# Patient Record
Sex: MALE | Race: WHITE | Employment: STUDENT | URBAN - METROPOLITAN AREA
[De-identification: names, ages, dates, MRNs, and addresses within clinical notes are randomized per-mention and may not be internally consistent; named-entity substitution may affect disease eponyms.]

---

## 2017-07-22 ENCOUNTER — ANESTHESIA EVENT (OUTPATIENT)
Dept: SURGERY | Facility: HOSPITAL | Age: 22
End: 2017-07-22
Payer: COMMERCIAL

## 2017-07-22 ENCOUNTER — HOSPITAL ENCOUNTER (OUTPATIENT)
Facility: HOSPITAL | Age: 22
Setting detail: HOSPITAL OUTPATIENT SURGERY
Discharge: HOME | End: 2017-07-22
Attending: OTOLARYNGOLOGY | Admitting: OTOLARYNGOLOGY
Payer: COMMERCIAL

## 2017-07-22 ENCOUNTER — APPOINTMENT (OUTPATIENT)
Dept: CV DIAGNOSTICS | Facility: HOSPITAL | Age: 22
End: 2017-07-22
Attending: INTERNAL MEDICINE
Payer: COMMERCIAL

## 2017-07-22 ENCOUNTER — SURGERY (OUTPATIENT)
Age: 22
End: 2017-07-22

## 2017-07-22 ENCOUNTER — ANESTHESIA (OUTPATIENT)
Dept: SURGERY | Facility: HOSPITAL | Age: 22
End: 2017-07-22
Payer: COMMERCIAL

## 2017-07-22 VITALS
HEART RATE: 74 BPM | BODY MASS INDEX: 22.96 KG/M2 | TEMPERATURE: 98 F | HEIGHT: 78 IN | OXYGEN SATURATION: 99 % | DIASTOLIC BLOOD PRESSURE: 82 MMHG | SYSTOLIC BLOOD PRESSURE: 130 MMHG | RESPIRATION RATE: 16 BRPM | WEIGHT: 198.44 LBS

## 2017-07-22 DIAGNOSIS — J34.2 DEVIATED NASAL SEPTUM: ICD-10-CM

## 2017-07-22 DIAGNOSIS — J34.89 NASAL OBSTRUCTION: ICD-10-CM

## 2017-07-22 DIAGNOSIS — J34.3 HYPERTROPHY OF NASAL TURBINATES: ICD-10-CM

## 2017-07-22 DIAGNOSIS — M95.0 ACQUIRED DEFORMITY OF NOSE: ICD-10-CM

## 2017-07-22 LAB
ATRIAL RATE: 83 BPM
BUN BLD-MCNC: 14 MG/DL (ref 8–20)
CALCIUM BLD-MCNC: 9.1 MG/DL (ref 8.3–10.3)
CHLORIDE: 106 MMOL/L (ref 101–111)
CO2: 25 MMOL/L (ref 22–32)
CREAT BLD-MCNC: 0.98 MG/DL (ref 0.7–1.3)
GLUCOSE BLD-MCNC: 118 MG/DL (ref 70–99)
HAV IGM SER QL: 1.7 MG/DL (ref 1.7–3)
P AXIS: 78 DEGREES
P-R INTERVAL: 164 MS
POTASSIUM SERPL-SCNC: 3.6 MMOL/L (ref 3.6–5.1)
Q-T INTERVAL: 386 MS
QRS DURATION: 88 MS
QTC CALCULATION (BEZET): 453 MS
R AXIS: 80 DEGREES
SODIUM SERPL-SCNC: 140 MMOL/L (ref 136–144)
T AXIS: 60 DEGREES
VENTRICULAR RATE: 83 BPM

## 2017-07-22 PROCEDURE — 09BL0ZZ EXCISION OF NASAL TURBINATE, OPEN APPROACH: ICD-10-PCS | Performed by: OTOLARYNGOLOGY

## 2017-07-22 PROCEDURE — 09UK07Z SUPPLEMENT NASAL MUCOSA AND SOFT TISSUE WITH AUTOLOGOUS TISSUE SUBSTITUTE, OPEN APPROACH: ICD-10-PCS | Performed by: OTOLARYNGOLOGY

## 2017-07-22 PROCEDURE — 93306 TTE W/DOPPLER COMPLETE: CPT | Performed by: INTERNAL MEDICINE

## 2017-07-22 PROCEDURE — 0NSB0ZZ REPOSITION NASAL BONE, OPEN APPROACH: ICD-10-PCS | Performed by: OTOLARYNGOLOGY

## 2017-07-22 PROCEDURE — 93005 ELECTROCARDIOGRAM TRACING: CPT

## 2017-07-22 PROCEDURE — 09CM0ZZ EXTIRPATION OF MATTER FROM NASAL SEPTUM, OPEN APPROACH: ICD-10-PCS | Performed by: OTOLARYNGOLOGY

## 2017-07-22 PROCEDURE — 83735 ASSAY OF MAGNESIUM: CPT | Performed by: INTERNAL MEDICINE

## 2017-07-22 PROCEDURE — 80048 BASIC METABOLIC PNL TOTAL CA: CPT | Performed by: INTERNAL MEDICINE

## 2017-07-22 PROCEDURE — 93010 ELECTROCARDIOGRAM REPORT: CPT | Performed by: INTERNAL MEDICINE

## 2017-07-22 RX ORDER — HYDROCODONE BITARTRATE AND ACETAMINOPHEN 5; 325 MG/1; MG/1
TABLET ORAL
Status: COMPLETED
Start: 2017-07-22 | End: 2017-07-22

## 2017-07-22 RX ORDER — SODIUM CHLORIDE, SODIUM LACTATE, POTASSIUM CHLORIDE, CALCIUM CHLORIDE 600; 310; 30; 20 MG/100ML; MG/100ML; MG/100ML; MG/100ML
INJECTION, SOLUTION INTRAVENOUS CONTINUOUS
Status: DISCONTINUED | OUTPATIENT
Start: 2017-07-22 | End: 2017-07-22

## 2017-07-22 RX ORDER — HYDROCODONE BITARTRATE AND ACETAMINOPHEN 5; 325 MG/1; MG/1
1 TABLET ORAL AS NEEDED
Status: COMPLETED | OUTPATIENT
Start: 2017-07-22 | End: 2017-07-22

## 2017-07-22 RX ORDER — HYDROCODONE BITARTRATE AND ACETAMINOPHEN 5; 325 MG/1; MG/1
2 TABLET ORAL AS NEEDED
Status: COMPLETED | OUTPATIENT
Start: 2017-07-22 | End: 2017-07-22

## 2017-07-22 RX ORDER — DEXAMETHASONE SODIUM PHOSPHATE 4 MG/ML
4 VIAL (ML) INJECTION AS NEEDED
Status: ACTIVE | OUTPATIENT
Start: 2017-07-22 | End: 2017-07-22

## 2017-07-22 RX ORDER — OXYCODONE HYDROCHLORIDE AND ACETAMINOPHEN 5; 325 MG/1; MG/1
TABLET ORAL
Qty: 30 TABLET | Refills: 0 | Status: SHIPPED | OUTPATIENT
Start: 2017-07-22 | End: 2018-07-23 | Stop reason: ALTCHOICE

## 2017-07-22 RX ORDER — ONDANSETRON 2 MG/ML
4 INJECTION INTRAMUSCULAR; INTRAVENOUS AS NEEDED
Status: ACTIVE | OUTPATIENT
Start: 2017-07-22 | End: 2017-07-22

## 2017-07-22 RX ORDER — POTASSIUM CHLORIDE 29.8 MG/ML
INJECTION INTRAVENOUS
Status: COMPLETED
Start: 2017-07-22 | End: 2017-07-22

## 2017-07-22 RX ORDER — NALOXONE HYDROCHLORIDE 0.4 MG/ML
80 INJECTION, SOLUTION INTRAMUSCULAR; INTRAVENOUS; SUBCUTANEOUS AS NEEDED
Status: ACTIVE | OUTPATIENT
Start: 2017-07-22 | End: 2017-07-22

## 2017-07-22 RX ORDER — POTASSIUM CHLORIDE 29.8 MG/ML
40 INJECTION INTRAVENOUS ONCE
Status: DISCONTINUED | OUTPATIENT
Start: 2017-07-22 | End: 2017-07-22 | Stop reason: SDUPTHER

## 2017-07-22 RX ORDER — HYDROMORPHONE HYDROCHLORIDE 1 MG/ML
0.4 INJECTION, SOLUTION INTRAMUSCULAR; INTRAVENOUS; SUBCUTANEOUS EVERY 5 MIN PRN
Status: ACTIVE | OUTPATIENT
Start: 2017-07-22 | End: 2017-07-22

## 2017-07-22 RX ORDER — CEPHALEXIN 500 MG/1
500 CAPSULE ORAL 3 TIMES DAILY
Qty: 21 CAPSULE | Refills: 0 | Status: SHIPPED | OUTPATIENT
Start: 2017-07-22 | End: 2017-07-29

## 2017-07-22 RX ORDER — LIDOCAINE HYDROCHLORIDE AND EPINEPHRINE 10; 10 MG/ML; UG/ML
INJECTION, SOLUTION INFILTRATION; PERINEURAL AS NEEDED
Status: DISCONTINUED | OUTPATIENT
Start: 2017-07-22 | End: 2017-07-22 | Stop reason: HOSPADM

## 2017-07-22 RX ORDER — METOCLOPRAMIDE HYDROCHLORIDE 5 MG/ML
10 INJECTION INTRAMUSCULAR; INTRAVENOUS AS NEEDED
Status: ACTIVE | OUTPATIENT
Start: 2017-07-22 | End: 2017-07-22

## 2017-07-22 RX ORDER — CEFAZOLIN SODIUM 1 G/3ML
INJECTION, POWDER, FOR SOLUTION INTRAMUSCULAR; INTRAVENOUS
Status: DISCONTINUED | OUTPATIENT
Start: 2017-07-22 | End: 2017-07-22 | Stop reason: HOSPADM

## 2017-07-22 NOTE — OR NURSING
12 Lead EKG performed. 1152: Dr. Larios looked at 12 Lead EKG. No new orders received. Will monitor patient for a while longer in pacu. 200 Family notified of patient status and plan of care. Verbalized understanding.

## 2017-07-22 NOTE — H&P
HISTORY OF PRESENT ILLNESS: Gm Dumont is here for a followup on his nasal fracture and nasal obstruction. He is status post closed reduction of a nasal fracture which occurred on July 14, 2016. He is still having trouble with nasal obstruction.  This pre Sternocleidomastoid is symmetric. Neck has full range of motion. Ears: Right auricle reveals no lesions, EAC clear and normal, tympanic membrane is normal with normal mobility.  Left auricle reveals no lesions, EAC clear and normal, tympanic membrane is nor       Norberto Alcantar MD

## 2017-07-22 NOTE — OR NURSING
Dr. Kori Bailey here to see patient. Family at bedside. Lab here to draw labs. Awaiting stat bedside Echo.

## 2017-07-22 NOTE — OR NURSING
Dr. Kulwant Michelle called and stated that she spoke with Dr. Abbe Broussard and Dr. Abbe Broussard would like patient to be evaluated by cardiology. [id number] [address] with Dr. Marissa Mccord.   Dr. Marissa Mccord states going into emergency procedure and will not be able to see patien

## 2017-07-22 NOTE — OR NURSING
Patient eating soup and milkshake. Tolerating well. Family at cartside. Patient denies need for pain medication at this time. Nasal drip gauze dressing remains clean dry and intact. Potassium rider infusing.

## 2017-07-22 NOTE — BRIEF OP NOTE
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 2]Hypertrophy of nasal turbinates [J

## 2017-07-22 NOTE — OR NURSING
Patient noted to have short run of V tach. 8 beats, heart rate returned to 70's-80's. Patient states he was trying to swallow and felt heart rate go up, denies chest pain. Patient states he feels \"somewhat anxious about swallowing. \"  Page to Dr. Hiro George

## 2017-07-22 NOTE — OR NURSING
Echo completed. Awaiting Wilman Murillo from pharmacy. Afrin 2 sprays to both nostrils as okay per Dr. Abbe Broussard.

## 2017-07-22 NOTE — ANESTHESIA POSTPROCEDURE EVALUATION
Paul Ashkan Patient Status:  Hospital Outpatient Surgery   Age/Gender 23year old male MRN [id number]   [facility] SURGERY Attending Eda Foster MD   [facility] Day # 0 PCP Neo Smith MD       Anesthesia Post-op Note

## 2017-07-22 NOTE — OR NURSING
[facility] Parents went to get patient lunch. K rider infusing. Drainage from back of throat decreased since Afrin spray to nostrils and sitting more upright in bed.

## 2017-07-22 NOTE — CONSULTS
[facility] [address] [place] [address] Cardiology  Consultation Note      Francois Mena Patient Status:  Hospital Outpatient Surgery    1995 MRN [id number]   Location [facility] Attending Teresita Bhakta MD   Hosp Day # 0 PCP Bebe Rocha Past Surgical History:  WISDOM TEETH: OTHER  No date: OTHER SURGICAL HISTORY      Comment: no psh   No date: OTHER SURGICAL HISTORY      Comment: nasal    Family History  family history is not on file.     Social History   reports that he has never sm normally  Psychiatric: Normal mood and affect; answers questions appropriately  Dermatologic: No rashes; normal skin turgor    Diagnostic testing:    EKG: Normal sinus rhythm    Labs:   No results found for: PT, INR            Thank you for allowing our pr

## 2017-07-22 NOTE — OR NURSING
Patient continues to expectorate bloody drainage from mouth. Approximately 50 cc in suction canister.

## 2017-07-22 NOTE — OR NURSING
Dr. Samuel Carballo here to see patient. Patient denies chest pain or palpitations. States \"I know exactly when my heartrate went up, I was trying to swallow. \"    No further arrhythmias noted. 12 lead EKG ordered by Dr. Samuel Carballo.

## 2017-07-22 NOTE — OR NURSING
1234 Dr. Wm Alston here to see patient. Dr. Rashida Saucedo called and spoke with Dr. Wm Alston and would like stat BMP, Magnesium, and Echo. Dr. Rashida Saucedo will be down to see patient. Family aware.

## 2017-07-22 NOTE — ANESTHESIA PREPROCEDURE EVALUATION
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         Dental             Pulmonary    Pulmonary exam normal.                 Other findings            ASA: 1   Plan: general  NPO status verified and patient meets guide

## 2017-07-24 NOTE — OPERATIVE REPORT
[facility]    PATIENT'S NAME: Eriberto Puri   ATTENDING PHYSICIAN: Peter Collier M.D. OPERATING PHYSICIAN: Peter Collier M.D.    PATIENT ACCOUNT#:   [de-identified]    LOCATION:  PACU 1404 [facility] PACU 2 EDWP 10  MEDICAL RECORD #:   [id number]       DATE OF BIR mucoperichondrium on the right and on the left, using 10 mL on each side. I used 10 mL to inject the columella, the marginal incision area, and the dorsum of the nose, as well the nasofacial sulcus.   The patient was then prepped and draped in a standard s flap without any tears. I also raised an inferior tunnel on the patient's left side. I was able to raise the flap with only a very small tear in the midportion about 3 mm. This was at least long-term back.   I then raised the flap on the opposite side, whi lower lateral cartilages to the septal angle without change in rotation or the tip projection. I then used a 5-0 purple PDS to suture the tip defining points together without changing the tip anatomy.   I closed the columellar incision with a 6-0 blue Prol

## 2018-08-02 PROBLEM — R79.89 ELEVATED SERUM CREATININE: Status: ACTIVE | Noted: 2018-08-02

## (undated) DEVICE — PREP BETADINE SOL 5% EYE

## (undated) DEVICE — CAUTERY NEEDLE 2IN INS E1465

## (undated) DEVICE — GLOVE SURG SENSICARE SZ 8-1/2

## (undated) DEVICE — SUTURE PROLENE 6-0 P-3

## (undated) DEVICE — STANDARD HYPODERMIC NEEDLE,POLYPROPYLENE HUB: Brand: MONOJECT

## (undated) DEVICE — SUTURE PROLENE 4-0 PS-2

## (undated) DEVICE — SPLINT 1524050 5PK PAIR DOYLE II AIRWAY: Brand: DOYLE II ™

## (undated) DEVICE — TOWEL: OR BLU 80/CS: Brand: MEDICAL ACTION INDUSTRIES

## (undated) DEVICE — MARKER SKIN 2 TIP

## (undated) DEVICE — BIPOLAR FORCEPS CORD,BANANA LEADS: Brand: VALLEYLAB

## (undated) DEVICE — SYRINGE 10ML LL CONTRL SYRINGE

## (undated) DEVICE — SPLINT NASAL DENVER PETITE

## (undated) DEVICE — SUTURE PLAIN GUT 4-0 SC-1

## (undated) DEVICE — BLADE 1882040 5PK INFERIOR TURB 2MM

## (undated) DEVICE — HEAD AND NECK CDS-LF: Brand: MEDLINE INDUSTRIES, INC.

## (undated) DEVICE — #11 STERILE BLADE: Brand: POLYMER COATED BLADES

## (undated) DEVICE — SOL H2O 1000ML BTL

## (undated) DEVICE — SOL  .9 1000ML BTL

## (undated) DEVICE — REM POLYHESIVE ADULT PATIENT RETURN ELECTRODE: Brand: VALLEYLAB

## (undated) DEVICE — SUTURE PDS II 5-0 RB-1

## (undated) DEVICE — DECANTER BAG 9": Brand: MEDLINE INDUSTRIES, INC.

## (undated) DEVICE — SUTURE PLAIN GUT 6-0 PC-1

## (undated) DEVICE — KENDALL SCD EXPRESS SLEEVES, KNEE LENGTH, MEDIUM: Brand: KENDALL SCD

## (undated) DEVICE — APPLICATOR COTTON TIP 3 10/PK

## (undated) DEVICE — CODMAN® SURGICAL PATTIES 1/2" X 3" (1.27CM X 7.62CM): Brand: CODMAN®

## (undated) DEVICE — SUTURE ETHILON 4-0 P-3